# Patient Record
(demographics unavailable — no encounter records)

---

## 2025-06-20 NOTE — ADDENDUM
[FreeTextEntry1] :  I, Royce Vick, acted solely as a scribe for Dr. Yancey on this date on 06/20/2025.

## 2025-06-20 NOTE — HISTORY OF PRESENT ILLNESS
[Right] : right hand dominant [FreeTextEntry1] : He comes in today for evaluation of his left elbow after a fall 2 days ago. He rates his pain as 7/10. He has swelling and denies any radiating pain or numbness. He is taking ibuprofen as needed for his pain.    He is accompanied by his mother.   He was referred by Dr. Elise

## 2025-06-20 NOTE — END OF VISIT
[FreeTextEntry3] : This note was written by Royce Vick on 06/20/2025 acting solely as a scribe for Dr. Angel Yancey.   All medical record entries made by the Scribe were at my, Dr. Angel Yancey, direction and personally dictated by me on 06/20/2025. I have personally reviewed the chart and agree that the record accurately reflects my personal performance of the history, physical exam, assessment and plan.

## 2025-06-20 NOTE — DISCUSSION/SUMMARY
[FreeTextEntry1] : He has findings consistent with a probable occult left radial head fracture after a fall 2 days ago.   I had a discussion with the patient regarding today's visit, the prognosis of this diagnosis, and treatment recommendations and options. At this time, I recommended a sling for comfort. He was instructed on activity modifications, icing, and gentle range of motion exercises, and to avoid heavy lifting. He will follow up in 2 weeks.   The patient has agreed to the above plan of management and has expressed full understanding. All questions were fully answered to the patient's satisfaction.   My cumulative time spent on this visit included: Preparation for the visit, review of the medical records, review of pertinent diagnostic studies, examination and counseling of the patient on the above diagnosis, treatment plan and prognosis, orders of diagnostic tests, medication and/or appropriate procedures and documentation in the medical records of today's visit.

## 2025-06-20 NOTE — PHYSICAL EXAM
[de-identified] : - Constitutional: This is a male in no obvious distress.  He is accompanied by his mother. - Psych: Patient is alert and oriented to person, place and time.  Patient has a normal mood and affect. - Cardiovascular: Normal pulses throughout the upper extremities.  No significant varicosities are noted in the upper extremities.  ---  Examination of his left elbow demonstrates swelling laterally.  There is no obvious tenderness.  There is no focal tenderness along the radial head or neck.  He has limitation of terminal flexion and extension and pronation and supination.  There is no medial swelling or tenderness.  There are no bony blocks to motion.  He is neurovascularly intact distally.  [de-identified] :  AP, lateral, and radial head radiographs of the left elbow demonstrate no definitive fracture or dislocation.  There is a positive anterior fat pad sign.

## 2025-06-20 NOTE — CONSULT LETTER
[Dear  ___] : Dear  [unfilled], [Consult Letter:] : I had the pleasure of evaluating your patient, [unfilled]. [Please see my note below.] : Please see my note below. [Consult Closing:] : Thank you very much for allowing me to participate in the care of this patient.  If you have any questions, please do not hesitate to contact me. [Sincerely,] : Sincerely, [FreeTextEntry3] :  Angel Yancey M.D. Surgery of the Hand & Upper Extremity Orthopaedic Surgery Chief, Hand Service, [facility] Director, Hand Service, [facility]  of Orthopedic Surgery, [facility] [employer] at [facility]Email: [email or web address] Phone: [phone]

## 2025-06-25 NOTE — PHYSICAL EXAM
[de-identified] : - Constitutional: This is a male in no obvious distress.  He is accompanied by his mother. - Psych: Patient is alert and oriented to person, place and time.  Patient has a normal mood and affect. - Cardiovascular: Normal pulses throughout the upper extremities.  No significant varicosities are noted in the upper extremities.  ---  Examination of his left elbow demonstrates swelling laterally.  There is no obvious tenderness.  There is no focal tenderness along the radial head or neck.  He has limitation of terminal flexion and extension and pronation and supination.  There is no medial swelling or tenderness.  There are no bony blocks to motion.  He is neurovascularly intact distally.  [de-identified] :  AP, lateral, and radial head radiographs of the left elbow demonstrate no definitive fracture or dislocation.  There is a positive anterior fat pad sign.

## 2025-06-25 NOTE — HISTORY OF PRESENT ILLNESS
[FreeTextEntry1] : 3 weeks status post fall resulting in probable occult left radial head/neck fracture.  See note from when he was seen in the office 19 days ago.  I recommended use of a sling for comfort and gentle range of motion exercises.  He is   He is accompanied by his mother.

## 2025-07-02 NOTE — PHYSICAL EXAM
[de-identified] : - Constitutional: This is a male in no obvious distress.  He is accompanied by his mother. - Psych: Patient is alert and oriented to person, place and time.  Patient has a normal mood and affect. - Cardiovascular: Normal pulses throughout the upper extremities.  No significant varicosities are noted in the upper extremities.  ---  Examination of his left elbow demonstrates swelling laterally.  There is no obvious tenderness.  There is no focal tenderness along the radial head or neck.  He has limitation of terminal flexion and extension and pronation and supination.  There is no medial swelling or tenderness.  There are no bony blocks to motion.  He is neurovascularly intact distally.  [de-identified] :  AP, lateral, and radial head radiographs of the left elbow demonstrate no definitive fracture or dislocation.  There is a positive anterior fat pad sign.